# Patient Record
Sex: MALE | Race: WHITE | NOT HISPANIC OR LATINO | Employment: OTHER | URBAN - METROPOLITAN AREA
[De-identification: names, ages, dates, MRNs, and addresses within clinical notes are randomized per-mention and may not be internally consistent; named-entity substitution may affect disease eponyms.]

---

## 2020-09-01 ENCOUNTER — OFFICE VISIT (OUTPATIENT)
Dept: URBAN - METROPOLITAN AREA CLINIC 68 | Facility: CLINIC | Age: 80
End: 2020-09-01

## 2021-10-11 ENCOUNTER — OFFICE VISIT (OUTPATIENT)
Dept: URBAN - METROPOLITAN AREA CLINIC 68 | Facility: CLINIC | Age: 81
End: 2021-10-11

## 2021-11-09 ENCOUNTER — OFFICE VISIT (OUTPATIENT)
Dept: URBAN - METROPOLITAN AREA CLINIC 68 | Facility: CLINIC | Age: 81
End: 2021-11-09

## 2021-12-13 ENCOUNTER — OFFICE VISIT (OUTPATIENT)
Dept: URBAN - METROPOLITAN AREA CLINIC 68 | Facility: CLINIC | Age: 81
End: 2021-12-13

## 2022-01-12 ENCOUNTER — OFFICE VISIT (OUTPATIENT)
Dept: URBAN - METROPOLITAN AREA CLINIC 68 | Facility: CLINIC | Age: 82
End: 2022-01-12

## 2022-01-31 ENCOUNTER — OFFICE VISIT (OUTPATIENT)
Dept: URBAN - METROPOLITAN AREA CLINIC 68 | Facility: CLINIC | Age: 82
End: 2022-01-31

## 2022-02-24 ENCOUNTER — OFFICE VISIT (OUTPATIENT)
Dept: URBAN - METROPOLITAN AREA CLINIC 68 | Facility: CLINIC | Age: 82
End: 2022-02-24

## 2022-06-04 ENCOUNTER — TELEPHONE ENCOUNTER (OUTPATIENT)
Dept: URBAN - METROPOLITAN AREA CLINIC 68 | Facility: CLINIC | Age: 82
End: 2022-06-04

## 2022-06-05 ENCOUNTER — TELEPHONE ENCOUNTER (OUTPATIENT)
Dept: URBAN - METROPOLITAN AREA CLINIC 68 | Facility: CLINIC | Age: 82
End: 2022-06-05

## 2022-06-05 RX ORDER — AMOXICILLIN AND CLAVULANATE POTASSIUM 500; 125 MG/1; 1/1
TABLET, FILM COATED ORAL TWICE A DAY
Qty: 40 | Refills: 40 | Status: ACTIVE | COMMUNITY
Start: 2022-02-13

## 2022-06-05 RX ORDER — LISINOPRIL AND HYDROCHLOROTHIAZIDE TABLETS 20; 12.5 MG/1; MG/1
LISINOPRIL-HYDROCHLOROTHIAZIDE( 20-12.5MG ORAL   ) ACTIVE -HX ENTRY TABLET ORAL
Status: ACTIVE | COMMUNITY
Start: 2021-12-13

## 2022-06-05 RX ORDER — PANTOPRAZOLE SODIUM 20 MG/1
PANTOPRAZOLE SODIUM( 20MG ORAL   ) ACTIVE -HX ENTRY TABLET, DELAYED RELEASE ORAL
Status: ACTIVE | COMMUNITY
Start: 2021-12-13

## 2022-06-05 RX ORDER — SIMVASTATIN 40 MG/1
SIMVASTATIN( 40MG ORAL   ) ACTIVE -HX ENTRY TABLET, FILM COATED ORAL
Status: ACTIVE | COMMUNITY
Start: 2021-12-13

## 2022-06-05 RX ORDER — ASPIRIN 81 MG
ASPIR-LOW( 81MG ORAL   ) ACTIVE -HX ENTRY TABLET, DELAYED RELEASE (ENTERIC COATED) ORAL
Status: ACTIVE | COMMUNITY
Start: 2021-12-13

## 2022-06-25 ENCOUNTER — TELEPHONE ENCOUNTER (OUTPATIENT)
Age: 82
End: 2022-06-25

## 2022-06-26 ENCOUNTER — TELEPHONE ENCOUNTER (OUTPATIENT)
Age: 82
End: 2022-06-26

## 2022-06-26 RX ORDER — SIMVASTATIN 40 MG/1
SIMVASTATIN( 40MG ORAL   ) ACTIVE -HX ENTRY TABLET, FILM COATED ORAL
Status: ACTIVE | COMMUNITY
Start: 2021-12-13

## 2022-06-26 RX ORDER — ASPIRIN 81 MG/1
ASPIR-LOW( 81MG ORAL   ) ACTIVE -HX ENTRY TABLET ORAL
Status: ACTIVE | COMMUNITY
Start: 2021-12-13

## 2022-06-26 RX ORDER — PANTOPRAZOLE 20 MG/1
PANTOPRAZOLE SODIUM( 20MG ORAL   ) ACTIVE -HX ENTRY TABLET, DELAYED RELEASE ORAL
Status: ACTIVE | COMMUNITY
Start: 2021-12-13

## 2022-06-26 RX ORDER — AMOXICILLIN/POTASSIUM CLAV 500-125 MG
TABLET ORAL TWICE A DAY
Qty: 40 | Refills: 40 | Status: ACTIVE | COMMUNITY
Start: 2022-02-13

## 2022-06-26 RX ORDER — LISINOPRIL AND HYDROCHLOROTHIAZIDE TABLETS 20; 12.5 MG/1; MG/1
LISINOPRIL-HYDROCHLOROTHIAZIDE( 20-12.5MG ORAL   ) ACTIVE -HX ENTRY TABLET ORAL
Status: ACTIVE | COMMUNITY
Start: 2021-12-13

## 2023-05-17 ENCOUNTER — OFFICE VISIT (OUTPATIENT)
Dept: URBAN - METROPOLITAN AREA CLINIC 68 | Facility: CLINIC | Age: 83
End: 2023-05-17

## 2023-05-17 RX ORDER — AMOXICILLIN/POTASSIUM CLAV 500-125 MG
TABLET ORAL TWICE A DAY
Qty: 40 | Refills: 40 | Status: ON HOLD | COMMUNITY
Start: 2022-02-13

## 2023-05-17 RX ORDER — SIMVASTATIN 40 MG/1
SIMVASTATIN( 40MG ORAL   ) ACTIVE -HX ENTRY TABLET, FILM COATED ORAL
Status: ACTIVE | COMMUNITY
Start: 2021-12-13

## 2023-05-17 RX ORDER — PANTOPRAZOLE 20 MG/1
PANTOPRAZOLE SODIUM( 20MG ORAL   ) ACTIVE -HX ENTRY TABLET, DELAYED RELEASE ORAL
Status: ACTIVE | COMMUNITY
Start: 2021-12-13

## 2023-05-17 RX ORDER — ASPIRIN 81 MG/1
ASPIR-LOW( 81MG ORAL   ) ACTIVE -HX ENTRY TABLET ORAL
Status: ON HOLD | COMMUNITY
Start: 2021-12-13

## 2023-05-17 RX ORDER — LISINOPRIL AND HYDROCHLOROTHIAZIDE TABLETS 20; 12.5 MG/1; MG/1
LISINOPRIL-HYDROCHLOROTHIAZIDE( 20-12.5MG ORAL   ) ACTIVE -HX ENTRY TABLET ORAL
Status: ACTIVE | COMMUNITY
Start: 2021-12-13

## 2023-05-17 NOTE — HPI-MIGRATED HPI
General : Previously diagnosed SIBO and was taking augmentin only one daily  for 10 days and symptoms of nausea , bloat, dizzyness, early sateity would resolve Avoids lactose and carbonation  Weaker and lost 7 pounds last July last labs normal  Last colon 8 yrs ago

## 2023-05-19 ENCOUNTER — OFFICE VISIT (OUTPATIENT)
Dept: URBAN - METROPOLITAN AREA SURGERY CENTER 12 | Facility: SURGERY CENTER | Age: 83
End: 2023-05-19

## 2023-06-06 ENCOUNTER — OFFICE VISIT (OUTPATIENT)
Dept: URBAN - METROPOLITAN AREA CLINIC 67 | Facility: CLINIC | Age: 83
End: 2023-06-06
Payer: MEDICARE

## 2023-06-06 ENCOUNTER — OFFICE VISIT (OUTPATIENT)
Dept: URBAN - METROPOLITAN AREA CLINIC 67 | Facility: CLINIC | Age: 83
End: 2023-06-06

## 2023-06-06 DIAGNOSIS — K80.20 CALCULUS OF GALLBLADDER WITHOUT CHOLECYSTITIS WITHOUT OBSTRUCTION: ICD-10-CM

## 2023-06-06 PROCEDURE — 93976 VASCULAR STUDY: CPT | Performed by: INTERNAL MEDICINE

## 2023-06-06 PROCEDURE — 76705 ECHO EXAM OF ABDOMEN: CPT | Performed by: INTERNAL MEDICINE

## 2023-06-29 ENCOUNTER — OFFICE VISIT (OUTPATIENT)
Dept: URBAN - METROPOLITAN AREA CLINIC 68 | Facility: CLINIC | Age: 83
End: 2023-06-29
Payer: MEDICARE

## 2023-06-29 ENCOUNTER — TELEPHONE ENCOUNTER (OUTPATIENT)
Dept: URBAN - METROPOLITAN AREA CLINIC 68 | Facility: CLINIC | Age: 83
End: 2023-06-29

## 2023-06-29 VITALS
WEIGHT: 159 LBS | DIASTOLIC BLOOD PRESSURE: 82 MMHG | BODY MASS INDEX: 26.49 KG/M2 | HEIGHT: 65 IN | SYSTOLIC BLOOD PRESSURE: 126 MMHG

## 2023-06-29 DIAGNOSIS — R63.4 UNEXPLAINED WEIGHT LOSS: ICD-10-CM

## 2023-06-29 DIAGNOSIS — K58.0 IRRITABLE BOWEL SYNDROME WITH DIARRHEA: ICD-10-CM

## 2023-06-29 DIAGNOSIS — K80.20 CALCULUS OF GALLBLADDER WITHOUT CHOLECYSTITIS WITHOUT OBSTRUCTION: ICD-10-CM

## 2023-06-29 DIAGNOSIS — R10.13 EPIGASTRIC PAIN: ICD-10-CM

## 2023-06-29 DIAGNOSIS — K63.89 OTHER SPECIFIED DISEASES OF INTESTINE: ICD-10-CM

## 2023-06-29 PROCEDURE — 99214 OFFICE O/P EST MOD 30 MIN: CPT | Performed by: SPECIALIST

## 2023-06-29 RX ORDER — RIFAXIMIN 550 MG/1
1 TABLET TABLET ORAL THREE TIMES A DAY
Qty: 42 TABLET | Refills: 3 | OUTPATIENT
Start: 2023-06-29 | End: 2023-08-24

## 2023-06-29 RX ORDER — RIFAXIMIN 550 MG/1
1 TABLET TABLET ORAL THREE TIMES A DAY
Qty: 42 TABLET | Refills: 2 | OUTPATIENT
Start: 2023-07-03 | End: 2023-08-28

## 2023-06-29 RX ORDER — PANTOPRAZOLE SODIUM 40 MG/1
1 TABLET TABLET, DELAYED RELEASE ORAL ONCE A DAY
Qty: 30 TABLET | Refills: 11
Start: 2021-12-13

## 2023-06-29 RX ORDER — SIMVASTATIN 40 MG/1
SIMVASTATIN( 40MG ORAL   ) ACTIVE -HX ENTRY TABLET, FILM COATED ORAL
Status: ACTIVE | COMMUNITY
Start: 2021-12-13

## 2023-06-29 RX ORDER — LISINOPRIL AND HYDROCHLOROTHIAZIDE TABLETS 20; 12.5 MG/1; MG/1
LISINOPRIL-HYDROCHLOROTHIAZIDE( 20-12.5MG ORAL   ) ACTIVE -HX ENTRY TABLET ORAL
Status: ACTIVE | COMMUNITY
Start: 2021-12-13

## 2023-06-29 RX ORDER — PANTOPRAZOLE 20 MG/1
PANTOPRAZOLE SODIUM( 20MG ORAL   ) ACTIVE -HX ENTRY TABLET, DELAYED RELEASE ORAL
Status: ACTIVE | COMMUNITY
Start: 2021-12-13

## 2023-06-29 RX ORDER — ASPIRIN 81 MG/1
ASPIR-LOW( 81MG ORAL   ) ACTIVE -HX ENTRY TABLET ORAL
Status: ACTIVE | COMMUNITY
Start: 2021-12-13

## 2023-06-29 NOTE — HPI-MIGRATED HPI
6/29   egd + gerd, on pantoprazole\ US + gallstones General : Previously diagnosed SIBO and was taking augmentin only one daily  for 10 days and symptoms of nausea , bloat, dizzyness, early sateity would resolve Avoids lactose and carbonation  Weaker and lost 7 pounds last July last labs normal  Last colon 8 yrs ago

## 2023-07-12 ENCOUNTER — OFFICE VISIT (OUTPATIENT)
Dept: URBAN - METROPOLITAN AREA CLINIC 68 | Facility: CLINIC | Age: 83
End: 2023-07-12
Payer: MEDICARE

## 2023-07-12 ENCOUNTER — LAB OUTSIDE AN ENCOUNTER (OUTPATIENT)
Dept: URBAN - METROPOLITAN AREA CLINIC 68 | Facility: CLINIC | Age: 83
End: 2023-07-12

## 2023-07-12 VITALS
BODY MASS INDEX: 26.49 KG/M2 | DIASTOLIC BLOOD PRESSURE: 50 MMHG | WEIGHT: 159 LBS | SYSTOLIC BLOOD PRESSURE: 120 MMHG | HEIGHT: 65 IN

## 2023-07-12 DIAGNOSIS — R68.81 EARLY SATIETY: ICD-10-CM

## 2023-07-12 DIAGNOSIS — K58.0 IRRITABLE BOWEL SYNDROME WITH DIARRHEA: ICD-10-CM

## 2023-07-12 DIAGNOSIS — K63.89 SMALL INTESTINAL BACTERIAL OVERGROWTH: ICD-10-CM

## 2023-07-12 DIAGNOSIS — R11.0 NAUSEA: ICD-10-CM

## 2023-07-12 DIAGNOSIS — K80.20 CALCULUS OF GALLBLADDER WITHOUT CHOLECYSTITIS WITHOUT OBSTRUCTION: ICD-10-CM

## 2023-07-12 PROBLEM — 422587007: Status: ACTIVE | Noted: 2023-07-12

## 2023-07-12 PROBLEM — 442076002: Status: ACTIVE | Noted: 2023-07-12

## 2023-07-12 PROBLEM — 70342003: Status: ACTIVE | Noted: 2023-06-22

## 2023-07-12 PROBLEM — 197125005: Status: ACTIVE | Noted: 2023-06-29

## 2023-07-12 PROCEDURE — 99214 OFFICE O/P EST MOD 30 MIN: CPT

## 2023-07-12 RX ORDER — ASPIRIN 81 MG/1
ASPIR-LOW( 81MG ORAL   ) ACTIVE -HX ENTRY TABLET ORAL
Status: ACTIVE | COMMUNITY
Start: 2021-12-13

## 2023-07-12 RX ORDER — RIFAXIMIN 550 MG/1
1 TABLET TABLET ORAL THREE TIMES A DAY
Qty: 42 TABLET | Refills: 2 | Status: ACTIVE | COMMUNITY
Start: 2023-07-03 | End: 2023-08-28

## 2023-07-12 RX ORDER — PANTOPRAZOLE SODIUM 40 MG/1
1 TABLET TABLET, DELAYED RELEASE ORAL ONCE A DAY
Qty: 30 TABLET | Refills: 11 | Status: ACTIVE | COMMUNITY
Start: 2021-12-13

## 2023-07-12 RX ORDER — LISINOPRIL AND HYDROCHLOROTHIAZIDE TABLETS 20; 12.5 MG/1; MG/1
LISINOPRIL-HYDROCHLOROTHIAZIDE( 20-12.5MG ORAL   ) ACTIVE -HX ENTRY TABLET ORAL
Status: ACTIVE | COMMUNITY
Start: 2021-12-13

## 2023-07-12 RX ORDER — SIMVASTATIN 40 MG/1
SIMVASTATIN( 40MG ORAL   ) ACTIVE -HX ENTRY TABLET, FILM COATED ORAL
Status: ACTIVE | COMMUNITY
Start: 2021-12-13

## 2023-07-12 RX ORDER — RIFAXIMIN 550 MG/1
1 TABLET TABLET ORAL THREE TIMES A DAY
Qty: 42 TABLET | Refills: 3 | Status: ACTIVE | COMMUNITY
Start: 2023-06-29 | End: 2023-08-24

## 2023-07-12 NOTE — HPI-TODAY'S VISIT:
81 y/o male with history of SIBO and bloating.  Recent ultrasound did show gallstones however his symptoms are non-specific and he denies any abdominal pain.  He is currently on his sixth day of a seven-day Xifaxan course and feels some improvement.  He was instructed to take the second pulse of Xifaxan if he feels symptoms return.  He states that he is nauseated most of the time and feels as though his food takes a long time to digest.  He describes early satiety along with the bloating and has experienced some weight loss.  Will proceed with gastric emptying study to rule out gastroparesis.  He denies vomiting, dysphagia, melena, rectal bleeding, fever.

## 2023-07-13 ENCOUNTER — OFFICE VISIT (OUTPATIENT)
Dept: URBAN - METROPOLITAN AREA CLINIC 68 | Facility: CLINIC | Age: 83
End: 2023-07-13

## 2023-08-10 ENCOUNTER — OFFICE VISIT (OUTPATIENT)
Dept: URBAN - METROPOLITAN AREA CLINIC 68 | Facility: CLINIC | Age: 83
End: 2023-08-10

## 2023-08-11 ENCOUNTER — OFFICE VISIT (OUTPATIENT)
Dept: URBAN - METROPOLITAN AREA CLINIC 68 | Facility: CLINIC | Age: 83
End: 2023-08-11

## 2023-08-16 ENCOUNTER — OFFICE VISIT (OUTPATIENT)
Dept: URBAN - METROPOLITAN AREA CLINIC 68 | Facility: CLINIC | Age: 83
End: 2023-08-16
Payer: MEDICARE

## 2023-08-16 VITALS
BODY MASS INDEX: 26.66 KG/M2 | SYSTOLIC BLOOD PRESSURE: 118 MMHG | WEIGHT: 160 LBS | DIASTOLIC BLOOD PRESSURE: 80 MMHG | HEIGHT: 65 IN

## 2023-08-16 DIAGNOSIS — K63.89 SMALL INTESTINAL BACTERIAL OVERGROWTH: ICD-10-CM

## 2023-08-16 DIAGNOSIS — K58.0 IRRITABLE BOWEL SYNDROME WITH DIARRHEA: ICD-10-CM

## 2023-08-16 DIAGNOSIS — K80.20 CALCULUS OF GALLBLADDER WITHOUT CHOLECYSTITIS WITHOUT OBSTRUCTION: ICD-10-CM

## 2023-08-16 DIAGNOSIS — R68.81 EARLY SATIETY: ICD-10-CM

## 2023-08-16 PROBLEM — 446081009: Status: ACTIVE | Noted: 2023-07-12

## 2023-08-16 PROCEDURE — 99213 OFFICE O/P EST LOW 20 MIN: CPT | Performed by: SPECIALIST

## 2023-08-16 RX ORDER — ASPIRIN 81 MG/1
ASPIR-LOW( 81MG ORAL   ) ACTIVE -HX ENTRY TABLET ORAL
Status: ACTIVE | COMMUNITY
Start: 2021-12-13

## 2023-08-16 RX ORDER — RIFAXIMIN 550 MG/1
1 TABLET TABLET ORAL THREE TIMES A DAY
OUTPATIENT
Start: 2023-07-03

## 2023-08-16 RX ORDER — PANTOPRAZOLE SODIUM 40 MG/1
1 TABLET TABLET, DELAYED RELEASE ORAL ONCE A DAY
Qty: 30 TABLET | Refills: 11 | Status: ACTIVE | COMMUNITY
Start: 2021-12-13

## 2023-08-16 RX ORDER — SIMVASTATIN 40 MG/1
SIMVASTATIN( 40MG ORAL   ) ACTIVE -HX ENTRY TABLET, FILM COATED ORAL
Status: ACTIVE | COMMUNITY
Start: 2021-12-13

## 2023-08-16 RX ORDER — RIFAXIMIN 550 MG/1
1 TABLET TABLET ORAL THREE TIMES A DAY
Qty: 42 TABLET | Refills: 2 | Status: ACTIVE | COMMUNITY
Start: 2023-07-03 | End: 2023-08-28

## 2023-08-16 RX ORDER — LISINOPRIL AND HYDROCHLOROTHIAZIDE TABLETS 20; 12.5 MG/1; MG/1
LISINOPRIL-HYDROCHLOROTHIAZIDE( 20-12.5MG ORAL   ) ACTIVE -HX ENTRY TABLET ORAL
Status: ACTIVE | COMMUNITY
Start: 2021-12-13

## 2023-08-16 NOTE — HPI-TODAY'S VISIT:
8/16 tid xifaxan for one week 'less bloat and pressure not as tired , good appetite no biliary colic   definite improvement needs repeat course of xifaxan if and when symptoms return, avoid probiotics      PREVIOUSLY 83 y/o male with history of SIBO and bloating.  Recent ultrasound did show gallstones however his symptoms are non-specific and he denies any abdominal pain.  He is currently on his sixth day of a seven-day Xifaxan course and feels some improvement.  He was instructed to take the second pulse of Xifaxan if he feels symptoms return.  He states that he is nauseated most of the time and feels as though his food takes a long time to digest.  He describes early satiety along with the bloating and has experienced some weight loss.  Will proceed with gastric emptying study to rule out gastroparesis.  He denies vomiting, dysphagia, melena, rectal bleeding, fever.

## 2023-08-26 ENCOUNTER — TELEPHONE ENCOUNTER (OUTPATIENT)
Dept: URBAN - METROPOLITAN AREA CLINIC 68 | Facility: CLINIC | Age: 83
End: 2023-08-26

## 2023-11-21 ENCOUNTER — OFFICE VISIT (OUTPATIENT)
Dept: URBAN - METROPOLITAN AREA CLINIC 68 | Facility: CLINIC | Age: 83
End: 2023-11-21

## 2023-11-28 ENCOUNTER — OFFICE VISIT (OUTPATIENT)
Dept: URBAN - METROPOLITAN AREA CLINIC 68 | Facility: CLINIC | Age: 83
End: 2023-11-28
Payer: MEDICARE

## 2023-11-28 VITALS
SYSTOLIC BLOOD PRESSURE: 150 MMHG | BODY MASS INDEX: 26.99 KG/M2 | WEIGHT: 162 LBS | TEMPERATURE: 97.8 F | HEART RATE: 98 BPM | HEIGHT: 65 IN | RESPIRATION RATE: 16 BRPM | DIASTOLIC BLOOD PRESSURE: 68 MMHG | OXYGEN SATURATION: 98 %

## 2023-11-28 DIAGNOSIS — K63.89 SMALL INTESTINAL BACTERIAL OVERGROWTH: ICD-10-CM

## 2023-11-28 DIAGNOSIS — K58.0 IRRITABLE BOWEL SYNDROME WITH DIARRHEA: ICD-10-CM

## 2023-11-28 DIAGNOSIS — K80.20 CALCULUS OF GALLBLADDER WITHOUT CHOLECYSTITIS WITHOUT OBSTRUCTION: ICD-10-CM

## 2023-11-28 DIAGNOSIS — R68.81 EARLY SATIETY: ICD-10-CM

## 2023-11-28 PROCEDURE — 99214 OFFICE O/P EST MOD 30 MIN: CPT | Performed by: SPECIALIST

## 2023-11-28 RX ORDER — RIFAXIMIN 550 MG/1
1 TABLET TABLET ORAL THREE TIMES A DAY
OUTPATIENT

## 2023-11-28 RX ORDER — PANTOPRAZOLE SODIUM 40 MG/1
1 TABLET TABLET, DELAYED RELEASE ORAL ONCE A DAY
Qty: 30 TABLET | Refills: 11 | Status: ACTIVE | COMMUNITY
Start: 2021-12-13

## 2023-11-28 RX ORDER — SIMVASTATIN 40 MG/1
SIMVASTATIN( 40MG ORAL   ) ACTIVE -HX ENTRY TABLET, FILM COATED ORAL
Status: ACTIVE | COMMUNITY
Start: 2021-12-13

## 2023-11-28 RX ORDER — RIFAXIMIN 550 MG/1
1 TABLET TABLET ORAL THREE TIMES A DAY
Status: ACTIVE | COMMUNITY
Start: 2023-07-03

## 2023-11-28 RX ORDER — LISINOPRIL AND HYDROCHLOROTHIAZIDE TABLETS 20; 12.5 MG/1; MG/1
LISINOPRIL-HYDROCHLOROTHIAZIDE( 20-12.5MG ORAL   ) ACTIVE -HX ENTRY TABLET ORAL
Status: ACTIVE | COMMUNITY
Start: 2021-12-13

## 2023-11-28 NOTE — HPI-TODAY'S VISIT:
11/29  definite recurrent symptoms of bloat and pain , resolved with xifaxan ,  uses one week   every 2 months   IMPRESSION Definite symptom relier with pulse dose xifaxan  SIBO IBS-D  PLAN Xifaxan TID for 7 -10 days every 8 weeks or as needed smitha bloat dyspepsia and pain       8/16 tid xifaxan for one week 'less bloat and pressure not as tired , good appetite no biliary colic   definite improvement needs repeat course of xifaxan if and when symptoms return, avoid probiotics      PREVIOUSLY 83 y/o male with history of SIBO and bloating.  Recent ultrasound did show gallstones however his symptoms are non-specific and he denies any abdominal pain.  He is currently on his sixth day of a seven-day Xifaxan course and feels some improvement.  He was instructed to take the second pulse of Xifaxan if he feels symptoms return.  He states that he is nauseated most of the time and feels as though his food takes a long time to digest.  He describes early satiety along with the bloating and has experienced some weight loss.  Will proceed with gastric emptying study to rule out gastroparesis.  He denies vomiting, dysphagia, melena, rectal bleeding, fever.

## 2024-03-14 ENCOUNTER — OV EP (OUTPATIENT)
Dept: URBAN - METROPOLITAN AREA CLINIC 68 | Facility: CLINIC | Age: 84
End: 2024-03-14

## 2024-03-19 ENCOUNTER — OV EP (OUTPATIENT)
Dept: URBAN - METROPOLITAN AREA CLINIC 68 | Facility: CLINIC | Age: 84
End: 2024-03-19

## 2024-03-19 VITALS
HEIGHT: 65 IN | OXYGEN SATURATION: 97 % | WEIGHT: 166 LBS | BODY MASS INDEX: 27.66 KG/M2 | SYSTOLIC BLOOD PRESSURE: 120 MMHG | DIASTOLIC BLOOD PRESSURE: 64 MMHG | HEART RATE: 62 BPM

## 2024-03-19 RX ORDER — RIFAXIMIN 550 MG/1
1 TABLET TABLET ORAL THREE TIMES A DAY
Status: ACTIVE | COMMUNITY

## 2024-03-19 RX ORDER — RIFAXIMIN 550 MG/1
1 TABLET TABLET ORAL THREE TIMES A DAY
Qty: 42 TABLET | Refills: 3 | OUTPATIENT

## 2024-03-19 RX ORDER — LISINOPRIL AND HYDROCHLOROTHIAZIDE TABLETS 20; 12.5 MG/1; MG/1
LISINOPRIL-HYDROCHLOROTHIAZIDE( 20-12.5MG ORAL   ) ACTIVE -HX ENTRY TABLET ORAL
Status: ACTIVE | COMMUNITY
Start: 2021-12-13

## 2024-03-19 RX ORDER — SULFAMETHOXAZOLE AND TRIMETHOPRIM 800; 160 MG/1; MG/1
1 TABLET TABLET ORAL TWICE A DAY
Qty: 28 TABLET | Refills: 3 | OUTPATIENT
Start: 2024-03-19 | End: 2024-05-14

## 2024-03-19 RX ORDER — SIMVASTATIN 40 MG/1
SIMVASTATIN( 40MG ORAL   ) ACTIVE -HX ENTRY TABLET, FILM COATED ORAL
Status: ACTIVE | COMMUNITY
Start: 2021-12-13

## 2024-03-19 RX ORDER — PANTOPRAZOLE SODIUM 40 MG/1
1 TABLET TABLET, DELAYED RELEASE ORAL ONCE A DAY
Qty: 30 TABLET | Refills: 11 | Status: ACTIVE | COMMUNITY
Start: 2021-12-13

## 2024-03-19 NOTE — HPI-TODAY'S VISIT:
GETS recurrent symptoms every 2 months  Xifaxaneffective and significantly improves symptoms , needs refill every 2 months   No allergies to antibioitic   PLAN alternative Antibiotic Bacrim DS if Xifaxan fails or as to prevent Xifaxan antibioitid resistance to therapy  then Xifaxan as needed  11/29  definite recurrent symptoms of bloat and pain, resolved with xifaxan,  uses one week   every 2 months   IMPRESSION Definite symptom relier with pulse dose xifaxan  SIBO IBS-D  PLAN Xifaxan TID for 7 -10 days every 8 weeks or as needed smitha bloat dyspepsia and pain       8/16 tid xifaxan for one week 'less bloat and pressure not as tired, good appetite no biliary colic   definite improvement needs repeat course of xifaxan if and when symptoms return, avoid probiotics      PREVIOUSLY 81 y/o male with history of SIBO and bloating.  Recent ultrasound did show gallstones however his symptoms are non-specific and he denies any abdominal pain.  He is currently on his sixth day of a seven-day Xifaxan course and feels some improvement.  He was instructed to take the second pulse of Xifaxan if he feels symptoms return.  He states that he is nauseated most of the time and feels as though his food takes a long time to digest.  He describes early satiety along with the bloating and has experienced some weight loss.  Will proceed with gastric emptying study to rule out gastroparesis.  He denies vomiting, dysphagia, melena, rectal bleeding, fever.

## 2024-07-19 ENCOUNTER — TELEPHONE ENCOUNTER (OUTPATIENT)
Dept: URBAN - METROPOLITAN AREA CLINIC 68 | Facility: CLINIC | Age: 84
End: 2024-07-19

## 2024-09-17 ENCOUNTER — DASHBOARD ENCOUNTERS (OUTPATIENT)
Age: 84
End: 2024-09-17

## 2024-09-18 ENCOUNTER — OFFICE VISIT (OUTPATIENT)
Dept: URBAN - METROPOLITAN AREA CLINIC 68 | Facility: CLINIC | Age: 84
End: 2024-09-18

## 2024-09-18 VITALS
DIASTOLIC BLOOD PRESSURE: 78 MMHG | TEMPERATURE: 97.7 F | HEIGHT: 65 IN | BODY MASS INDEX: 27.66 KG/M2 | WEIGHT: 166 LBS | HEART RATE: 67 BPM | SYSTOLIC BLOOD PRESSURE: 116 MMHG | OXYGEN SATURATION: 98 %

## 2024-09-18 PROBLEM — 72950008: Status: ACTIVE | Noted: 2024-09-18

## 2024-09-18 RX ORDER — LISINOPRIL AND HYDROCHLOROTHIAZIDE TABLETS 20; 12.5 MG/1; MG/1
LISINOPRIL-HYDROCHLOROTHIAZIDE( 20-12.5MG ORAL   ) ACTIVE -HX ENTRY TABLET ORAL
Status: ACTIVE | COMMUNITY
Start: 2021-12-13

## 2024-09-18 RX ORDER — PANTOPRAZOLE SODIUM 40 MG/1
1 TABLET TABLET, DELAYED RELEASE ORAL ONCE A DAY
Qty: 30 TABLET | Refills: 11 | Status: ACTIVE | COMMUNITY
Start: 2021-12-13

## 2024-09-18 RX ORDER — SIMVASTATIN 40 MG/1
SIMVASTATIN( 40MG ORAL   ) ACTIVE -HX ENTRY TABLET, FILM COATED ORAL
Status: ACTIVE | COMMUNITY
Start: 2021-12-13

## 2024-09-18 RX ORDER — PANTOPRAZOLE SODIUM 40 MG/1
1 TABLET TABLET, DELAYED RELEASE ORAL ONCE A DAY
Qty: 90 | Refills: 3
Start: 2021-12-13

## 2024-09-18 RX ORDER — RIFAXIMIN 550 MG/1
1 TABLET TABLET ORAL THREE TIMES A DAY
Qty: 42 TABLET | Refills: 3

## 2024-09-18 RX ORDER — RIFAXIMIN 550 MG/1
1 TABLET TABLET ORAL THREE TIMES A DAY
Qty: 42 TABLET | Refills: 3 | Status: ACTIVE | COMMUNITY

## 2024-09-18 NOTE — HPI-TODAY'S VISIT:
GETS recurrent symptoms every 2 months  Xifaxan effective and significantly improves symptoms, needs refill every 2 months   No allergies to antibiotic   PLAN alternative Antibiotic Bactrim DS if Xifaxan fails or as to prevent Xifaxan antibiotic resistance to therapy  then Xifaxan as needed  11/29  definite recurrent symptoms of bloat and pain, resolved with Xifaxan,  uses one week   every 2 months   IMPRESSION Definite symptom relier with pulse dose Xifaxan  SIBO IBS-D  PLAN Xifaxan TID for 7 -10 days every 8 weeks or as needed smitha bloat dyspepsia and pain       8/16 tid Xifaxan for one week 'less bloat and pressure not as tired, good appetite no biliary colic   definite improvement needs to repeat course of Xifaxan if and when symptoms return, avoid probiotics      PREVIOUSLY 81 y/o male with history of SIBO and bloating.  Recent ultrasound did show gallstones however his symptoms are non-specific and he denies any abdominal pain.  He is currently on his sixth day of a seven-day Xifaxan course and feels some improvement.  He was instructed to take the second pulse of Xifaxan if he feels symptoms return.  He states that he is nauseated most of the time and feels as though his food takes a long time to digest.  He describes early satiety along with the bloating and has experienced some weight loss.  Will proceed with gastric emptying study to rule out gastroparesis.  He denies vomiting, dysphagia, melena, rectal bleeding, fever.

## 2025-03-05 ENCOUNTER — OFFICE VISIT (OUTPATIENT)
Dept: URBAN - METROPOLITAN AREA CLINIC 68 | Facility: CLINIC | Age: 85
End: 2025-03-05
Payer: MEDICARE

## 2025-03-05 VITALS
WEIGHT: 165 LBS | DIASTOLIC BLOOD PRESSURE: 76 MMHG | HEIGHT: 65 IN | BODY MASS INDEX: 27.49 KG/M2 | SYSTOLIC BLOOD PRESSURE: 128 MMHG

## 2025-03-05 DIAGNOSIS — K80.20 CALCULUS OF GALLBLADDER WITHOUT CHOLECYSTITIS WITHOUT OBSTRUCTION: ICD-10-CM

## 2025-03-05 DIAGNOSIS — E73.9 ADULT LACTASE DEFICIENCY: ICD-10-CM

## 2025-03-05 DIAGNOSIS — K63.89 SMALL INTESTINAL BACTERIAL OVERGROWTH: ICD-10-CM

## 2025-03-05 DIAGNOSIS — K58.0 IRRITABLE BOWEL SYNDROME WITH DIARRHEA: ICD-10-CM

## 2025-03-05 DIAGNOSIS — R35.0 URINARY FREQUENCY: ICD-10-CM

## 2025-03-05 DIAGNOSIS — R68.81 EARLY SATIETY: ICD-10-CM

## 2025-03-05 PROBLEM — 38032004: Status: ACTIVE | Noted: 2025-03-05

## 2025-03-05 PROCEDURE — 99214 OFFICE O/P EST MOD 30 MIN: CPT | Performed by: SPECIALIST

## 2025-03-05 RX ORDER — PANTOPRAZOLE SODIUM 40 MG/1
1 TABLET TABLET, DELAYED RELEASE ORAL ONCE A DAY
Qty: 90 | Refills: 3 | Status: ACTIVE | COMMUNITY
Start: 2021-12-13

## 2025-03-05 RX ORDER — LISINOPRIL AND HYDROCHLOROTHIAZIDE TABLETS 20; 12.5 MG/1; MG/1
LISINOPRIL-HYDROCHLOROTHIAZIDE( 20-12.5MG ORAL   ) ACTIVE -HX ENTRY TABLET ORAL
Status: ACTIVE | COMMUNITY
Start: 2021-12-13

## 2025-03-05 RX ORDER — SIMVASTATIN 40 MG/1
SIMVASTATIN( 40MG ORAL   ) ACTIVE -HX ENTRY TABLET, FILM COATED ORAL
Status: ACTIVE | COMMUNITY
Start: 2021-12-13

## 2025-03-05 RX ORDER — RIFAXIMIN 550 MG/1
1 TABLET TABLET ORAL THREE TIMES A DAY
Qty: 42 TABLET | Refills: 3

## 2025-03-05 RX ORDER — CEFDINIR 300 MG/1
AS DIRECTED CAPSULE ORAL
Status: ON HOLD | COMMUNITY

## 2025-03-05 RX ORDER — RIFAXIMIN 550 MG/1
1 TABLET TABLET ORAL THREE TIMES A DAY
Qty: 42 TABLET | Refills: 3 | Status: ACTIVE | COMMUNITY

## 2025-03-05 RX ORDER — PANTOPRAZOLE SODIUM 40 MG/1
1 TABLET TABLET, DELAYED RELEASE ORAL ONCE A DAY
Qty: 90 | Refills: 3

## 2025-03-05 NOTE — HPI-TODAY'S VISIT:
3/5 Still using Xifaxan  very effective , for worsening of bloat , fatigue and discomfort , , last dose december  er  Usually symptopms controlled  Notes gas and bloat after ice cream   REC cont xifaxan one week evry 2-3 months as needed Latrase tablets treatment  FU as needed   PRIOR GETS recurrent symptoms every 2 months  Xifaxan effective and significantly improves symptoms, needs refill every 2 months   No allergies to antibiotic   PLAN alternative Antibiotic Bactrim DS if Xifaxan fails or as to prevent Xifaxan antibiotic resistance to therapy  then Xifaxan as needed  11/29  definite recurrent symptoms of bloat and pain, resolved with Xifaxan,  uses one week   every 2 months   IMPRESSION Definite symptom relier with pulse dose Xifaxan  SIBO IBS-D  PLAN Xifaxan TID for 7 -10 days every 8 weeks or as needed smitha bloat dyspepsia and pain       8/16 tid Xifaxan for one week 'less bloat and pressure not as tired, good appetite no biliary colic   definite improvement needs to repeat course of Xifaxan if and when symptoms return, avoid probiotics      PREVIOUSLY 81 y/o male with history of SIBO and bloating.  Recent ultrasound did show gallstones however his symptoms are non-specific and he denies any abdominal pain.  He is currently on his sixth day of a seven-day Xifaxan course and feels some improvement.  He was instructed to take the second pulse of Xifaxan if he feels symptoms return.  He states that he is nauseated most of the time and feels as though his food takes a long time to digest.  He describes early satiety along with the bloating and has experienced some weight loss.  Will proceed with gastric emptying study to rule out gastroparesis.  He denies vomiting, dysphagia, melena, rectal bleeding, fever.

## 2025-07-09 ENCOUNTER — OFFICE VISIT (OUTPATIENT)
Dept: URBAN - METROPOLITAN AREA CLINIC 68 | Facility: CLINIC | Age: 85
End: 2025-07-09

## 2025-07-09 ENCOUNTER — LAB OUTSIDE AN ENCOUNTER (OUTPATIENT)
Dept: URBAN - METROPOLITAN AREA CLINIC 68 | Facility: CLINIC | Age: 85
End: 2025-07-09

## 2025-07-09 RX ORDER — SIMVASTATIN 40 MG/1
SIMVASTATIN( 40MG ORAL   ) ACTIVE -HX ENTRY TABLET, FILM COATED ORAL
Status: ACTIVE | COMMUNITY
Start: 2021-12-13

## 2025-07-09 RX ORDER — RIFAXIMIN 550 MG/1
1 TABLET TABLET ORAL THREE TIMES A DAY
Qty: 42 TABLET | Refills: 3 | Status: ON HOLD | COMMUNITY

## 2025-07-09 RX ORDER — CEFDINIR 300 MG/1
AS DIRECTED CAPSULE ORAL
Status: ON HOLD | COMMUNITY

## 2025-07-09 RX ORDER — PANTOPRAZOLE SODIUM 40 MG/1
1 TABLET TABLET, DELAYED RELEASE ORAL ONCE A DAY
Qty: 90 | Refills: 3 | Status: ACTIVE | COMMUNITY

## 2025-07-09 RX ORDER — LISINOPRIL AND HYDROCHLOROTHIAZIDE TABLETS 20; 12.5 MG/1; MG/1
LISINOPRIL-HYDROCHLOROTHIAZIDE( 20-12.5MG ORAL   ) ACTIVE -HX ENTRY TABLET ORAL
Status: ACTIVE | COMMUNITY
Start: 2021-12-13

## 2025-07-09 NOTE — HPI-TODAY'S VISIT:
7/9/25 DIFFERENT symptoms  used xifaxan in the past , effective   LUQ pain after heavy food ribs and onions  food now effective causeing pain under ribcage  pork and steak    could be biliary colic     3/5 Still using Xifaxan  very effective , for worsening of bloat , fatigue and discomfort , , last dose December  er  Usually symptoms controlled  Notes gas and bloat after ice cream   REC cont xifaxan one week evry 2-3 months as needed Lactrase tablets treatment  FU as needed   PRIOR GETS recurrent symptoms every 2 months  Xifaxan effective and significantly improves symptoms, needs refill every 2 months   No allergies to antibiotic   PLAN alternative Antibiotic Bactrim DS if Xifaxan fails or as to prevent Xifaxan antibiotic resistance to therapy  then Xifaxan as needed  11/29  definite recurrent symptoms of bloat and pain, resolved with Xifaxan,  uses one week   every 2 months   IMPRESSION Definite symptom relier with pulse dose Xifaxan  SIBO IBS-D  PLAN Xifaxan TID for 7 -10 days every 8 weeks or as needed smitha bloat dyspepsia and pain       8/16 tid Xifaxan for one week 'less bloat and pressure not as tired, good appetite no biliary colic   definite improvement needs to repeat course of Xifaxan if and when symptoms return, avoid probiotics      PREVIOUSLY 83 y/o male with history of SIBO and bloating.  Recent ultrasound did show gallstones however his symptoms are non-specific and he denies any abdominal pain.  He is currently on his sixth day of a seven-day Xifaxan course and feels some improvement.  He was instructed to take the second pulse of Xifaxan if he feels symptoms return.  He states that he is nauseated most of the time and feels as though his food takes a long time to digest.  He describes early satiety along with the bloating and has experienced some weight loss.  Will proceed with gastric emptying study to rule out gastroparesis.  He denies vomiting, dysphagia, melena, rectal bleeding, fever. show

## 2025-07-23 ENCOUNTER — LAB OUTSIDE AN ENCOUNTER (OUTPATIENT)
Dept: URBAN - METROPOLITAN AREA CLINIC 68 | Facility: CLINIC | Age: 85
End: 2025-07-23

## 2025-07-23 ENCOUNTER — OFFICE VISIT (OUTPATIENT)
Dept: URBAN - METROPOLITAN AREA CLINIC 68 | Facility: CLINIC | Age: 85
End: 2025-07-23

## 2025-07-23 RX ORDER — RIFAXIMIN 550 MG/1
1 TABLET TABLET ORAL THREE TIMES A DAY
Qty: 42 TABLET | Refills: 3 | Status: ON HOLD | COMMUNITY

## 2025-07-23 RX ORDER — LISINOPRIL AND HYDROCHLOROTHIAZIDE TABLETS 20; 12.5 MG/1; MG/1
LISINOPRIL-HYDROCHLOROTHIAZIDE( 20-12.5MG ORAL   ) ACTIVE -HX ENTRY TABLET ORAL
Status: ACTIVE | COMMUNITY
Start: 2021-12-13

## 2025-07-23 RX ORDER — SIMVASTATIN 40 MG/1
SIMVASTATIN( 40MG ORAL   ) ACTIVE -HX ENTRY TABLET, FILM COATED ORAL
Status: ACTIVE | COMMUNITY
Start: 2021-12-13

## 2025-07-23 RX ORDER — CEFDINIR 300 MG/1
AS DIRECTED CAPSULE ORAL
Status: ON HOLD | COMMUNITY

## 2025-07-23 RX ORDER — PANTOPRAZOLE SODIUM 40 MG/1
1 TABLET TABLET, DELAYED RELEASE ORAL ONCE A DAY
Qty: 90 | Refills: 3 | Status: ACTIVE | COMMUNITY

## 2025-07-23 NOTE — HPI-TODAY'S VISIT:
7/9/25 DIFFERENT symptoms  used xifaxan in the past , effective   LUQ pain after heavy food ribs and onions  food now effective causeing pain under ribcage  pork and steak    could be biliary colic     3/5 Still using Xifaxan  very effective , for worsening of bloat , fatigue and discomfort , , last dose December  er  Usually symptoms controlled  Notes gas and bloat after ice cream   REC cont xifaxan one week evry 2-3 months as needed Lactrase tablets treatment  FU as needed   PRIOR GETS recurrent symptoms every 2 months  Xifaxan effective and significantly improves symptoms, needs refill every 2 months   No allergies to antibiotic   PLAN alternative Antibiotic Bactrim DS if Xifaxan fails or as to prevent Xifaxan antibiotic resistance to therapy  then Xifaxan as needed  11/29  definite recurrent symptoms of bloat and pain, resolved with Xifaxan,  uses one week   every 2 months   IMPRESSION Definite symptom relier with pulse dose Xifaxan  SIBO IBS-D  PLAN Xifaxan TID for 7 -10 days every 8 weeks or as needed smitha bloat dyspepsia and pain       8/16 tid Xifaxan for one week 'less bloat and pressure not as tired, good appetite no biliary colic   definite improvement needs to repeat course of Xifaxan if and when symptoms return, avoid probiotics      PREVIOUSLY 81 y/o male with history of SIBO and bloating.  Recent ultrasound did show gallstones however his symptoms are non-specific and he denies any abdominal pain.  He is currently on his sixth day of a seven-day Xifaxan course and feels some improvement.  He was instructed to take the second pulse of Xifaxan if he feels symptoms return.  He states that he is nauseated most of the time and feels as though his food takes a long time to digest.  He describes early satiety along with the bloating and has experienced some weight loss.  Will proceed with gastric emptying study to rule out gastroparesis.  He denies vomiting, dysphagia, melena, rectal bleeding, fever.

## 2025-08-22 ENCOUNTER — CLAIMS CREATED FROM THE CLAIM WINDOW (OUTPATIENT)
Dept: URBAN - METROPOLITAN AREA CLINIC 4 | Facility: CLINIC | Age: 85
End: 2025-08-22
Payer: MEDICARE

## 2025-08-22 ENCOUNTER — CLAIMS CREATED FROM THE CLAIM WINDOW (OUTPATIENT)
Dept: URBAN - METROPOLITAN AREA SURGERY CENTER 12 | Facility: SURGERY CENTER | Age: 85
End: 2025-08-22

## 2025-08-22 ENCOUNTER — CLAIMS CREATED FROM THE CLAIM WINDOW (OUTPATIENT)
Dept: URBAN - METROPOLITAN AREA SURGERY CENTER 12 | Facility: SURGERY CENTER | Age: 85
End: 2025-08-22
Payer: MEDICARE

## 2025-08-22 DIAGNOSIS — K31.7 POLYP OF STOMACH AND DUODENUM: ICD-10-CM

## 2025-08-22 DIAGNOSIS — K31.89 OTHER DISEASES OF STOMACH AND DUODENUM: ICD-10-CM

## 2025-08-22 PROCEDURE — 88305 TISSUE EXAM BY PATHOLOGIST: CPT | Performed by: PATHOLOGY

## 2025-08-22 PROCEDURE — 43239 EGD BIOPSY SINGLE/MULTIPLE: CPT | Performed by: CLINIC/CENTER

## 2025-08-22 PROCEDURE — 43239 EGD BIOPSY SINGLE/MULTIPLE: CPT | Performed by: SPECIALIST
